# Patient Record
Sex: MALE | URBAN - METROPOLITAN AREA
[De-identification: names, ages, dates, MRNs, and addresses within clinical notes are randomized per-mention and may not be internally consistent; named-entity substitution may affect disease eponyms.]

---

## 2020-03-12 ENCOUNTER — APPOINTMENT (RX ONLY)
Dept: URBAN - METROPOLITAN AREA CLINIC 139 | Facility: CLINIC | Age: 70
Setting detail: DERMATOLOGY
End: 2020-03-12

## 2021-06-02 ENCOUNTER — OFFICE VISIT (OUTPATIENT)
Dept: URBAN - METROPOLITAN AREA CLINIC 33 | Facility: CLINIC | Age: 71
End: 2021-06-02
Payer: MEDICARE

## 2021-06-02 DIAGNOSIS — H35.371 PUCKERING OF MACULA, RIGHT EYE: ICD-10-CM

## 2021-06-02 DIAGNOSIS — H47.291 OTHER OPTIC ATROPHY, RIGHT EYE: ICD-10-CM

## 2021-06-02 DIAGNOSIS — H52.4 PRESBYOPIA: ICD-10-CM

## 2021-06-02 DIAGNOSIS — Z96.1 PRESENCE OF INTRAOCULAR LENS: ICD-10-CM

## 2021-06-02 DIAGNOSIS — E11.9 TYPE 2 DIABETES MELLITUS W/O COMPLICATION: ICD-10-CM

## 2021-06-02 PROCEDURE — 92134 CPTRZ OPH DX IMG PST SGM RTA: CPT | Performed by: OPTOMETRIST

## 2021-06-02 PROCEDURE — 99204 OFFICE O/P NEW MOD 45 MIN: CPT | Performed by: OPTOMETRIST

## 2021-06-02 ASSESSMENT — INTRAOCULAR PRESSURE
OD: 15
OS: 16

## 2021-06-02 ASSESSMENT — VISUAL ACUITY: OS: 20/40

## 2021-06-02 NOTE — IMPRESSION/PLAN
Impression: Puckering of macula, right eye: H35.371. Plan: Educated patient about today's findings.  Macular pucker does not appear to be visually significant at this time and may be monitored with dilated eye exam.

## 2021-06-02 NOTE — IMPRESSION/PLAN
Impression: Type 2 diabetes with stable prolif diabetic rtnop, left eye: Y27.5266. Plan: History of extensive PRP, Avastin injection, and vitreous hemorrhage OS. No signs of hemorrhages at this time. No treatment needed at this time. Discussed importance of blood sugar control. Recommend monitoring condition every 6 months.

## 2021-06-02 NOTE — IMPRESSION/PLAN
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 Plan: History of CRAO OD. No treatment needed at this time.

## 2021-06-02 NOTE — IMPRESSION/PLAN
Impression: Presbyopia: H52.4. Plan: Issued new glasses prescription today. Discussed balance lens OD. Monitor for vision changes.

## 2021-06-02 NOTE — IMPRESSION/PLAN
Impression: Type 2 diabetes mellitus w/o complication: G52.6. Plan: Diabetes w/o Complications: No signs of diabetic retinopathy noted in right eye. Continue to monitor condition.

## 2021-12-08 ENCOUNTER — OFFICE VISIT (OUTPATIENT)
Dept: URBAN - METROPOLITAN AREA CLINIC 33 | Facility: CLINIC | Age: 71
End: 2021-12-08
Payer: MEDICARE

## 2021-12-08 DIAGNOSIS — E11.3552 TYPE 2 DIABETES WITH STABLE PROLIF DIABETIC RTNOP, LEFT EYE: Primary | ICD-10-CM

## 2021-12-08 PROCEDURE — 99213 OFFICE O/P EST LOW 20 MIN: CPT | Performed by: OPTOMETRIST

## 2021-12-08 ASSESSMENT — INTRAOCULAR PRESSURE
OS: 16
OD: 15

## 2021-12-08 NOTE — IMPRESSION/PLAN
Impression: Type 2 diabetes with stable prolif diabetic rtnop, left eye: E66.6990. Plan: S/P PRP, Avastin injection, and vitreous hemorrhage OS. Condition is stable and without need of treatment. Continue to monitor condition closely. Patient is NLP OD.

## 2022-06-13 ENCOUNTER — OFFICE VISIT (OUTPATIENT)
Dept: URBAN - METROPOLITAN AREA CLINIC 33 | Facility: CLINIC | Age: 72
End: 2022-06-13
Payer: MEDICARE

## 2022-06-13 DIAGNOSIS — E11.3552 TYPE 2 DIABETES WITH STABLE PROLIF DIABETIC RTNOP, LEFT EYE: Primary | ICD-10-CM

## 2022-06-13 DIAGNOSIS — Z96.1 PRESENCE OF INTRAOCULAR LENS: ICD-10-CM

## 2022-06-13 DIAGNOSIS — H47.291 OTHER OPTIC ATROPHY, RIGHT EYE: ICD-10-CM

## 2022-06-13 PROCEDURE — 99214 OFFICE O/P EST MOD 30 MIN: CPT | Performed by: OPTOMETRIST

## 2022-06-13 ASSESSMENT — INTRAOCULAR PRESSURE
OS: 15
OD: 15

## 2022-06-13 NOTE — IMPRESSION/PLAN
Impression: Other optic atrophy, right eye: H49.111. Plan: History of CRAO OD. No treatment at this time will help. Patient understands.  No signs of pthisis bulbi

## 2022-06-13 NOTE — IMPRESSION/PLAN
Impression: Type 2 diabetes with stable prolif diabetic rtnop, left eye: Q81.2111. Plan: History of PDR. Condition is stable at this time with few peripheral hemorrhages. No signs of macular edema or developing neovascularization. Patient no longer sees Dr. Devin Pineda. Recommend 6 month follow-up with retina for continued monitoring of patient.

## 2023-01-05 ENCOUNTER — OFFICE VISIT (OUTPATIENT)
Dept: URBAN - METROPOLITAN AREA CLINIC 33 | Facility: CLINIC | Age: 73
End: 2023-01-05
Payer: MEDICARE

## 2023-01-05 DIAGNOSIS — Z96.1 PRESENCE OF INTRAOCULAR LENS: ICD-10-CM

## 2023-01-05 DIAGNOSIS — H47.291 OTHER OPTIC ATROPHY, RIGHT EYE: ICD-10-CM

## 2023-01-05 DIAGNOSIS — H04.123 DRY EYE SYNDROME OF BILATERAL LACRIMAL GLANDS: ICD-10-CM

## 2023-01-05 DIAGNOSIS — E11.3552 TYPE 2 DIABETES WITH STABLE PROLIF DIABETIC RTNOP, LEFT EYE: Primary | ICD-10-CM

## 2023-01-05 DIAGNOSIS — H43.813 VITREOUS DEGENERATION, BILATERAL: ICD-10-CM

## 2023-01-05 PROCEDURE — 99214 OFFICE O/P EST MOD 30 MIN: CPT

## 2023-01-05 ASSESSMENT — INTRAOCULAR PRESSURE
OD: 15
OS: 18

## 2023-01-05 NOTE — IMPRESSION/PLAN
Impression: Type 2 diabetes with stable prolif diabetic rtnop, left eye: E11.3552.
-mild background retinopathy Plan: Patient educated on all ocular findings. Stressed importance of following up with primary care physician, tight control over blood sugar, maintaining healthy diet, strict adherence to medication, and exercise. Patient is advised that a yearly ophthalmic exam is recommended. Return sooner if any new symptoms.

## 2023-01-05 NOTE — IMPRESSION/PLAN
Impression: Other optic atrophy, right eye: H44.785. Plan: History of CRAO OD. No treatment at this time will help. Patient understands.  No signs of pthisis bulbi